# Patient Record
Sex: MALE | Race: BLACK OR AFRICAN AMERICAN | Employment: UNEMPLOYED | URBAN - METROPOLITAN AREA
[De-identification: names, ages, dates, MRNs, and addresses within clinical notes are randomized per-mention and may not be internally consistent; named-entity substitution may affect disease eponyms.]

---

## 2021-04-25 PROCEDURE — 99284 EMERGENCY DEPT VISIT MOD MDM: CPT

## 2021-04-26 ENCOUNTER — HOSPITAL ENCOUNTER (EMERGENCY)
Age: 56
Discharge: HOME | End: 2021-04-26
Attending: EMERGENCY MEDICINE
Payer: COMMERCIAL

## 2021-04-26 VITALS
SYSTOLIC BLOOD PRESSURE: 131 MMHG | DIASTOLIC BLOOD PRESSURE: 70 MMHG | OXYGEN SATURATION: 97 % | BODY MASS INDEX: 25.9 KG/M2 | WEIGHT: 165 LBS | TEMPERATURE: 97.9 F | HEART RATE: 73 BPM | RESPIRATION RATE: 16 BRPM | HEIGHT: 67 IN

## 2021-04-26 DIAGNOSIS — Z72.0 TOBACCO ABUSE: ICD-10-CM

## 2021-04-26 DIAGNOSIS — T50.901A ACCIDENTAL DRUG OVERDOSE, INITIAL ENCOUNTER: Primary | ICD-10-CM

## 2021-04-26 DIAGNOSIS — R03.0 ELEVATED BLOOD PRESSURE READING: ICD-10-CM

## 2021-04-26 DIAGNOSIS — T40.1X1A ACCIDENTAL OVERDOSE OF HEROIN, INITIAL ENCOUNTER (HCC): ICD-10-CM

## 2021-04-26 RX ORDER — NALOXONE HYDROCHLORIDE 4 MG/.1ML
SPRAY NASAL
Qty: 2 EACH | Refills: 0 | Status: SHIPPED | OUTPATIENT
Start: 2021-04-26

## 2021-04-26 RX ORDER — NALOXONE HYDROCHLORIDE 4 MG/.1ML
SPRAY NASAL
Qty: 2 EACH | Refills: 0 | OUTPATIENT
Start: 2021-04-26 | End: 2021-04-26 | Stop reason: SDUPTHER

## 2021-04-26 NOTE — DISCHARGE INSTRUCTIONS
Ting Sosa scheduled using triage protocol. Patients will be evaluated and referred to appropriate treatment. A  is usually assigned, but there is usually a waiting list. All [facility] residents without financial resources may be referred to [facility]. Patients must bring proof that they are residents of the [address] (Intrinsic-ID, rent receipt, picture ID, etc.).   [phone]  Crisis: [facility] and [facility]  700 [facility]     [phone]  Detox unit: [phone]  440 [facility]       Intakes are Monday, Wednesday, Thursday from 8 AM - 12 noon. Patients may walk in any day and speak with a counselor. Patient must bring a picture ID.   [phone]   [facility]       No detox available. Patient must be medically stable and able to work. Patient needs social security card and an ID. Patient does not need to be homeless. [address] [place] [address]       Detoxification available. Patients must be medically-cleared to go to detox and must be free of benzodiazepines and barbituates. THP prefers if they also have Clonidine available to help them with their detox. [address] [employer] 77 program available for men. Patients need to be able to work and follow rules. 90 days inpatient followed by 90 days in a [facility] house.    Valentine Sheikh  that hosts a number of [address] and NA groups each week   [phone]   [employer]  700 [facility] Patients must first go through registration and financial eligibility screening, 8 - 11:30 AM and 1 - 4 PM Mondays through Friday. [facility] also provides homeless services, vision, dental and medical services. [address] outpatient and some inpatient (sober living houses) for men and women. Fees are determined at time of admission. Patient must be able to work and follow rules. [address] for 67 [facility]       Outpatient program for men, women and adolescents. Assessments can usually be scheduled within 24 hours. Intensive outpatient programs also available. Methadone and Suboxone detoxification also available. They do not accept Medicaid or Medicare. [address] [facility] Service Google End: [address] End: [address]   Centralized Intake: [phone]  Crisis: [place] Morales. [phone]  Crisis: [facility]  [address]   [phone]  Crisis: [phone]   [address] Providers    Accepts Insured Patients Only:  [facility]  [address]       [phone]   Near the corner of [facility] and Door Van [facility] 430 in the near [place] end of [facility]. Accepts most insurance including Medicaid/Medicare. No psychiatry. On the [facility] bus line. 428 [facility] Center[facility] Hansen [phone]  [facility] ([address]  2000 [facility]. 30 [facility], [address] [place])     [phone]   Accepts most major insurances. Psychiatry available. Some DBT groups. [facility] [place])    [phone]   Mixture of psychologists and psychiatrists. They do not accept Medicaid or Medicare.     The [address]       [phone]   Mixture of clinical social workers and psychologists. Sliding Scale/Financial Aid/Differing Payment Options  [facility]  975 [facility]) [phone]   Our own Fantasma Gomez and Balta Elaine. Variety of treatment options, including DBT. [facility]  [address]       [phone]   Provides a variety of group and individual counseling options. Insurance, Medicaid, Medicare and sliding scale      Medicaid/Medicare providers in the [address] area  [address] [place] [address] Zehra. 22nd [address]       [phone]    [facility]         [address]       [phone]    [employer]  [address], [address]    [phone] ex.  751 [facility]     [phone]    [address] [place] [address]      [phone]      Services for patients without Medicaid, [place] or Insurance  The [address] [place] [address] Drive       [phone]   See handout in separate folder    Ruby Lozada

## 2021-04-26 NOTE — LETTER
[address] 
[facility] [place] [facility] EMERGENCY DEPT 
[address] [place] [phone] 
[phone] Work/School Note Date: 4/25/2021 To Whom It May concern: 
 
Mariann Prakash was seen and treated today in the emergency room by the following provider(s): 
Attending Provider: Dangelo Garcia MD. Mariann Prakash may return to work on 4/28/21. Sincerely, Manolo Sheppard MD

## 2021-12-15 ENCOUNTER — HOSPITAL ENCOUNTER (EMERGENCY)
Age: 56
Discharge: HOME | End: 2021-12-15
Attending: EMERGENCY MEDICINE
Payer: COMMERCIAL

## 2021-12-15 VITALS
DIASTOLIC BLOOD PRESSURE: 87 MMHG | WEIGHT: 160 LBS | BODY MASS INDEX: 23.7 KG/M2 | OXYGEN SATURATION: 96 % | TEMPERATURE: 97.7 F | HEART RATE: 87 BPM | SYSTOLIC BLOOD PRESSURE: 102 MMHG | RESPIRATION RATE: 18 BRPM | HEIGHT: 69 IN

## 2021-12-15 DIAGNOSIS — T40.1X1A ACCIDENTAL OVERDOSE OF HEROIN, INITIAL ENCOUNTER (HCC): Primary | ICD-10-CM

## 2021-12-15 PROCEDURE — 99284 EMERGENCY DEPT VISIT MOD MDM: CPT

## 2021-12-15 RX ORDER — NALOXONE HYDROCHLORIDE 1 MG/ML
2 INJECTION INTRAMUSCULAR; INTRAVENOUS; SUBCUTANEOUS AS NEEDED
Status: DISCONTINUED | OUTPATIENT
Start: 2021-12-15 | End: 2021-12-15 | Stop reason: HOSPADM

## 2021-12-15 NOTE — DISCHARGE INSTRUCTIONS
[address] [facility] [address]:  [facility] NEUROPSYCHIATRIC [facility] 85  [address]      [facility] 37       [phone]      Accepts Insured Patients Only:  [facility]  [address] [facility] [address]       [phone]  Near the corner of [facility] and Door Van [facility] 430 in the near Critical access hospital. Accepts most insurance including Medicaid/Medicare. No psychiatry. On the [facility] bus line. [address] most major insurances. Psychiatry available. Some DBT groups. [address] [facility] [address] 135 [phone]  Blanconolvia Sam, [facility] ([place])  Jerald Flight, [facility] ([place])  Agusto López [facility] (Adolescents SA)    [address] [facility] [address]  [facility] ([place])    [address] [facility], [address] [place])     [phone]   [facility] (Trauma)  Maximiliano Miller ([address])    [facility] Counseling 100[address] [facility] Ne)    [phone]   Mixture of psychologists and psychiatrists. They do not accept Medicaid or Medicare. The [facility] Group  [address] [place] [address]       [phone]   Mixture of clinical social workers and psychologists.     1011 [facility]        [phone]  3003 [facility], [address] [place] [address] Counseling and Treatment  (Clinicians: Meenakshi Shah LCSW and POOJA Hatch both specialize in Trauma)  [address], [address]        [phone]     [place] [address] [facility] [address]  [address], [address]         [address], 535 [facility], [address]  [address], [address]        [address], 535 [facility], 2231 [facility]  [address], [address]        [address] Jihan Cazares Se Counseling  [address]  [address], [address]        91 [facility] Counseling Associates Yani psychologists practice here)  KELLY Beth [address] [place], [address]  [place], 200 [facility]        [phone]    Sliding Scale/Financial Aid/Differing Payment Options  [employer] 25[address] [facility]) [phone]   Variety of treatment options, including DBT. [facility]  3[address] [facility]       [phone]   Provides a variety of group and individual counseling options. Insurance, Medicaid, Medicare and sliding scale    [place], [address]      [phone]    350 [facility] Psychological Services and Development ([facility])  [address] Anmol jenn [place], [address] [place]    [phone]  Training clinic for [employer] in Psychology; [facility] also carry some cases as well. Director: Ana Miller, Ph.D., LCP, NCSP (* She specializes in Anxiety; Child & Adol. Mental health)      Medicaid/Medicare providers in the [phone]    [facility]         [address]       [phone]    [place]  [address], [address]    [phone] ex. [place], [address]      [phone]    [address] [place] [address]     [phone]  [facility], [address] [address], [address]    1850 [facility], [address] [place] [address] Integrative Counseling Main Office    [phone] [facility] First Stephane [address] [place]    Intensive Community Outreach Services (ICOS)  Call ahead for appointment time  200 [facility]     [phone]    [address]      [facility] [address]    1 [facility]      [address] 19 [facility] BrooklynGopal Gifty [place]   [phone]      Services for patients without Medicaid, Medicare or Insurance  The [address]       [phone]   See handout in separate folder    1563 Patrice Lovelogica on-site, psychiatry, AA meetings, counseling and social workers  [place]: BarbaraGarcia Reinier 16 [phone]    [address] [place] [address] [place]:  Via [facility] [place] 129 [phone]      Medication Assisted Treatment (MAT) Programs  The purpose of Medication Assisted Treatment (MAT) programs is to provide quality treatment for individuals living with Substance Use Disorder. We understand that after patients undergo a detox, some may need MAT treatment services to provide additional assistance on their road to recovery. When it comes to medication assisted treatment, Suboxone, Buprenorphine, and other drugs can serve as a valuable resource during recovery. We know that making such a significant change is difficult for patients and want to do everything we can to make the addiction treatment process as smooth and comfortable as possible. There are several locations around [place]; many can offer same day or next day appointments. Please call      [facility] (1260 [facility])  [address] [place] [address], [address] [place], [place] [phone]  Standing ED [facility] ED referral appointment 10:00-11:00 Tuesday - Friday    [facility])   [address] [phone]  Standing [facility] ED referral appointment 10:00-11:00 Monday    [facility] location)   58 [facility]  [phone]  Call or email Ivan Pierce: [phone], [email or web address]  To schedule an appointment    The [facility]  [address] [facility] 6, [address]  for Recovery  Izzy Poole 70..   [place], [place] 310 [facility]  [address] [facility] [address] [facility] [address], [facility] [address] David [place], [address]  [address] [facility] [address] ([employer])  1516 [facility], [address]/Jaime Enamorado Final  [phone]. [phone]    Recovery from Addiction is possible!

## 2021-12-15 NOTE — ED PROVIDER NOTES
EMERGENCY DEPARTMENT HISTORY AND PHYSICAL EXAM     ------------------------------------------------------------------------------------------------------  Please note that this dictation was completed with [employer], the [employer] voice recognition software. Quite often unanticipated grammatical, syntax, homophones, and other interpretive errors are inadvertently transcribed by the computer software. Please disregard these errors. Please excuse any errors that have escaped final proofreading.  -----------------------------------------------------------------------------------------------------------------    Date: 12/15/2021  Patient Name: Brigida James    History of Presenting Illness     No chief complaint on file. History Provided By: Patient, EMS    HPI: Brigida James is a 64 y.o. male, with significant pmhx of substance abuse, who presents via EMS to the ED with c/o altered mental status. Patient's family came downstairs to find him sitting on the counter unresponsive. Patient with snoring respirations upon EMS arrival.  Was given 1 mg of IV Narcan with improvement of respiratory status and alert and oriented upon arrival to the emergency department. Patient reports having snorted heroin just before this occurred. Denies chest pain, shortness of breath, nausea, vomiting, visual disturbance. PCP: None    Social Hx: denies tobacco, denies EtOH, denies recreational/ Illicit Drugs     There are no other complaints, changes, or physical findings at this time. No Known Allergies      Current Facility-Administered Medications   Medication Dose Route Frequency Provider Last Rate Last Admin    naloxone (NARCAN) injection 2 mg  2 mg IntraVENous PRN Shirin Bowser MD         Current Outpatient Medications   Medication Sig Dispense Refill    naloxone (Narcan) 4 mg/actuation nasal spray Use 1 spray intranasally, then discard.  Repeat with new spray every 2 min as needed for opioid overdose symptoms, alternating nostrils. 2 Each 0       Past History     Past Medical History:  No past medical history on file. Past Surgical History:  No past surgical history on file. Family History:  No family history on file. Social History:  Social History     Tobacco Use    Smoking status: Not on file    Smokeless tobacco: Not on file   Substance Use Topics    Alcohol use: Not on file    Drug use: Not on file       Allergies:  No Known Allergies      Review of Systems   Review of Systems   Constitutional: Negative for chills and fever. HENT: Negative. Eyes: Negative. Respiratory: Negative for cough, chest tightness and shortness of breath. Cardiovascular: Negative for chest pain and leg swelling. Gastrointestinal: Negative for abdominal pain, diarrhea, nausea and vomiting. Endocrine: Negative. Genitourinary: Negative for difficulty urinating and dysuria. Musculoskeletal: Negative for myalgias. Skin: Negative. Neurological: Negative. Psychiatric/Behavioral: Negative. All other systems reviewed and are negative. Physical Exam   Physical Exam  Vitals and nursing note reviewed. Constitutional:       General: He is not in acute distress. Appearance: He is well-developed. He is not diaphoretic. HENT:      Head: Normocephalic and atraumatic. Nose: Nose normal.      Mouth/Throat:      Pharynx: No oropharyngeal exudate. Eyes:      Conjunctiva/sclera: Conjunctivae normal.      Pupils: Pupils are equal, round, and reactive to light. Neck:      Vascular: No JVD. Cardiovascular:      Rate and Rhythm: Normal rate and regular rhythm. Heart sounds: Normal heart sounds. No murmur heard. No friction rub. Pulmonary:      Effort: Pulmonary effort is normal. No respiratory distress. Breath sounds: Normal breath sounds. No stridor. No wheezing or rales. Abdominal:      General: Bowel sounds are normal. There is no distension. Palpations: Abdomen is soft. Tenderness: There is no abdominal tenderness. There is no rebound. Musculoskeletal:         General: No tenderness. Normal range of motion. Cervical back: Normal range of motion and neck supple. Skin:     General: Skin is warm and dry. Findings: No rash. Neurological:      Mental Status: He is alert and oriented to person, place, and time. Cranial Nerves: No cranial nerve deficit. Psychiatric:         Speech: Speech normal.         Behavior: Behavior normal.         Thought Content: Thought content normal.         Judgment: Judgment normal.           Diagnostic Study Results     Labs -   No results found for this or any previous visit (from the past 12 hour(s)). Radiologic Studies -   No orders to display     CT Results  (Last 48 hours)    None        CXR Results  (Last 48 hours)    None            Medical Decision Making   I am the first provider for this patient. I reviewed the vital signs, available nursing notes, past medical history, past surgical history, family history and social history. Vital Signs-Reviewed the patient's vital signs. Patient Vitals for the past 12 hrs:   Temp Pulse Resp BP SpO2   12/15/21 0345 -- -- -- (!) 111/59 95 %   12/15/21 0330 -- -- -- 115/63 95 %   12/15/21 0315 -- -- -- 114/65 95 %   12/15/21 0309 -- -- -- -- 97 %   12/15/21 0134 97.7 °F (36.5 °C) 87 18 121/70 96 %       Pulse Oximetry Analysis - 97% on RA Normal    Records Reviewed/Interpretted: Nursing Notes from triage and Old Medical Records, any previous accidental drug overdose 6 months ago    Provider Notes (Medical Decision Making):     DDX:  Heroin overdose    Plan:  Monitor, Narcan as needed    Impression:  Accidental heroin overdose    ED Course:   Initial assessment performed. The patients presenting problems have been discussed, and they are in agreement with the care plan formulated and outlined with them.   I have encouraged them to ask questions as they arise throughout their visit.    I reviewed our electronic medical record system for any past medical records that were available that may contribute to the patients current condition, the nursing notes and and vital signs from today's visit  Nursing notes will be reviewed as they become available in realtime while the pt has been in the ED. Jayden English MD        3:59 AM  Progress note:  Pt noted to be feeling better, remains awake alert ready for discharge. Pt will follow up with primary care mental health resources provided as instructed. All questions have been answered, pt voiced understanding and agreement with plan. Specific return precautions provided in addition to instructions for pt to return to the ED immediately should sx worsen at any time. Jayden English MD             Critical Care Time:     none      Diagnosis     Clinical Impression:   1. Accidental overdose of heroin, initial encounter ([facility] [address]        PLAN:  1. Current Discharge Medication List        2. Follow-up Information     Follow up With Specialties Details Why Contact Info    [facility] EMERGENCY DEPT Emergency Medicine  As needed [facility]  [address] [facility]  [phone]        Return to ED if worse     Disposition:    3:59 AM   The patient's results have been reviewed with family and/or caregiver. They verbally convey their understanding and agreement of the patient's signs, symptoms, diagnosis, treatment and prognosis and additionally agree to follow up as recommended in the discharge instructions or to return to the Emergency Room should the patient's condition change prior to their follow-up appointment. The family and/or caregiver verbally agrees with the care-plan and all of their questions have been answered.  The discharge instructions have also been provided to the them with educational information regarding the patient's diagnosis as well a list of reasons why the patient would want to return to the ER prior to their follow-up appointment should their condition change.   Leigh De Los Santos MD

## 2021-12-15 NOTE — ED NOTES
I have reviewed discharge instructions with the patient. The patient verbalized understanding. Pt given discharge instructions at this time. PT encouraged to return to the ED if he develops any new/worsening symptoms. Pt denies any additional needs or concerns at this time.

## 2021-12-15 NOTE — ED NOTES
PT ambulatory to room from triage. Pt here today with complaints of OD. Pt states that he snorted heroin tonight and walked home. PT states that once he got home he was talking to his family and passed out. PT states that he has done heroin before with no problems. Pt states that he has had a 10/10 frontal headache for the psat two weeks that he has been taking extra strength tylenol for. PT denies cp, sob, abd pain, n/v/d, fever, hitting head. Pt ANOX4, respirations even and unlabored, skin warm dry and intact, NAD noted. PT was given narcan in field PTA.

## 2022-08-19 ENCOUNTER — HOSPITAL ENCOUNTER (EMERGENCY)
Age: 57
Discharge: HOME | End: 2022-08-19
Attending: EMERGENCY MEDICINE
Payer: COMMERCIAL

## 2022-08-19 VITALS
WEIGHT: 164.8 LBS | BODY MASS INDEX: 24.41 KG/M2 | HEIGHT: 69 IN | TEMPERATURE: 98.9 F | OXYGEN SATURATION: 98 % | SYSTOLIC BLOOD PRESSURE: 107 MMHG | RESPIRATION RATE: 15 BRPM | HEART RATE: 91 BPM | DIASTOLIC BLOOD PRESSURE: 70 MMHG

## 2022-08-19 DIAGNOSIS — T40.1X1A ACCIDENTAL OVERDOSE OF HEROIN, INITIAL ENCOUNTER (HCC): Primary | ICD-10-CM

## 2022-08-19 LAB
COMMENT, HOLDF: NORMAL
SAMPLES BEING HELD,HOLD: NORMAL

## 2022-08-19 PROCEDURE — 99284 EMERGENCY DEPT VISIT MOD MDM: CPT

## 2022-08-19 PROCEDURE — 74011250636 HC RX REV CODE- 250/636

## 2022-08-19 PROCEDURE — 96374 THER/PROPH/DIAG INJ IV PUSH: CPT

## 2022-08-19 PROCEDURE — 36415 COLL VENOUS BLD VENIPUNCTURE: CPT

## 2022-08-19 RX ORDER — NALOXONE HYDROCHLORIDE 1 MG/ML
INJECTION INTRAMUSCULAR; INTRAVENOUS; SUBCUTANEOUS
Status: COMPLETED
Start: 2022-08-19 | End: 2022-08-19

## 2022-08-19 RX ORDER — NALOXONE HYDROCHLORIDE 1 MG/ML
2 INJECTION INTRAMUSCULAR; INTRAVENOUS; SUBCUTANEOUS
Status: COMPLETED | OUTPATIENT
Start: 2022-08-19 | End: 2022-08-19

## 2022-08-19 RX ADMIN — NALOXONE HYDROCHLORIDE 2 MG: 1 INJECTION PARENTERAL at 16:56

## 2022-08-19 RX ADMIN — NALOXONE HYDROCHLORIDE 2 MG: 1 INJECTION INTRAMUSCULAR; INTRAVENOUS; SUBCUTANEOUS at 16:56

## 2022-08-19 NOTE — ED NOTES
Emergency Department Nursing Plan of Care       The Nursing Plan of Care is developed from the Nursing assessment and Emergency Department Attending provider initial evaluation. The plan of care may be reviewed in the ED Provider note.     The Plan of Care was developed with the following considerations:   Patient / Family readiness to learn indicated by:verbalized understanding  Persons(s) to be included in education: patient  Barriers to Learning/Limitations:No    Signed     Laura Keita RN    8/19/2022   5:36 PM

## 2022-08-19 NOTE — ED NOTES
Discharge instructions were given to the patient by Lea Martin RN. The patient left the Emergency Department ambulatory, alert and oriented and in no acute distress with 0 prescriptions. The patient was encouraged to call or return to the ED for worsening issues or problems and was encouraged to schedule a follow up appointment for continuing care. The patient verbalized understanding of discharge instructions and prescriptions, all questions were answered. The patient has no further concerns at this time.

## 2022-08-19 NOTE — ED TRIAGE NOTES
Pt BIBA complaining of drug overdose. EMS reports pt's daughter called ems d/t pt being less responsive. EMS states pt was found standing up but would not respond to verbal commands. Pt's O2 was 85% on RA. EMS denies giving narcan. Pt responsive to noxious stimuli but will not answer questions. IV established. Pt given 2mg of Narcan IV. Pt more awake and alert. Pt endorses \"sniffing heroin\". Pt is tachycardic. Pt denies cocaine use.

## 2022-08-19 NOTE — ED PROVIDER NOTES
EMERGENCY DEPARTMENT HISTORY AND PHYSICAL EXAM      Date: 8/19/2022  Patient Name: Shaina Gutierrez    History of Presenting Illness     Chief Complaint   Patient presents with    Drug Overdose       History Provided By: EMS    HPI: Shaina Gutierrez, 64 y.o. male presents to the ED with cc of being found on the ground unresponsive by EMTs prior to arrival.  No signs of trauma. On ED arrival patient had pinpoint pupils bilaterally. Patient was able to maintain his own airway. Patient was given 2 mg of Narcan IV in the ED and woke up. There are no other associated symptoms, patient concerns, or physical findings at this time. I reviewed the vital signs, available nursing notes, past medical history, past surgical history, family history and social history. Vital Signs:  Patient Vitals for the past 12 hrs:   Temp Pulse Resp BP SpO2   08/19/22 1658 98.9 °F (37.2 °C) -- -- -- --   08/19/22 1658 -- (!) 158 22 -- 99 %   08/19/22 1652 98.8 °F (37.1 °C) (!) 145 11 114/78 (!) 85 %     Vital signs reviewed. Current Medications:  No current facility-administered medications on file prior to encounter. Current Outpatient Medications on File Prior to Encounter   Medication Sig Dispense Refill    naloxone (Narcan) 4 mg/actuation nasal spray Use 1 spray intranasally, then discard. Repeat with new spray every 2 min as needed for opioid overdose symptoms, alternating nostrils. 2 Each 0       Past History     Past Medical History:  No past medical history on file. Past Surgical History:  No past surgical history on file. Family History:  No family history on file. Social History: Allergies:  No Known Allergies      Review of Systems   Review of Systems   Constitutional:  Negative for fever. HENT:  Negative for sore throat and trouble swallowing. Eyes:  Negative for photophobia and redness. Respiratory:  Negative for cough and shortness of breath.     Cardiovascular:  Negative for chest pain and leg swelling. Gastrointestinal:  Negative for abdominal pain, constipation, diarrhea, nausea and vomiting. Endocrine: Negative for polydipsia and polyuria. Genitourinary:  Negative for dysuria, hematuria and scrotal swelling. Musculoskeletal:  Negative for back pain and joint swelling. Skin:  Negative for rash. Neurological:  Negative for dizziness, syncope, weakness and headaches. Psychiatric/Behavioral:  Positive for behavioral problems, confusion and decreased concentration. Negative for suicidal ideas. All other systems reviewed and are negative. Physical Exam   Physical Exam  Vitals and nursing note reviewed. Exam conducted with a chaperone present. Constitutional:       General: He is not in acute distress. Appearance: Normal appearance. He is well-developed. HENT:      Head: Normocephalic and atraumatic. Nose: Nose normal.      Mouth/Throat:      Pharynx: No oropharyngeal exudate. Eyes:      General:         Right eye: No discharge. Left eye: No discharge. Extraocular Movements: Extraocular movements intact. Conjunctiva/sclera: Conjunctivae normal.      Comments: Pinpoint pupils bilaterally. Neck:      Vascular: No JVD. Cardiovascular:      Rate and Rhythm: Normal rate and regular rhythm. Heart sounds: Normal heart sounds. Pulmonary:      Effort: Pulmonary effort is normal. No respiratory distress. Breath sounds: Normal breath sounds. No wheezing. Abdominal:      General: Bowel sounds are normal. There is no distension. Palpations: Abdomen is soft. Tenderness: There is no abdominal tenderness. There is no guarding or rebound. Musculoskeletal:         General: No tenderness. Normal range of motion. Cervical back: Normal range of motion and neck supple. Lymphadenopathy:      Cervical: No cervical adenopathy. Skin:     General: Skin is warm and dry. Capillary Refill: Capillary refill takes less than 2 seconds. Findings: No rash. Neurological:      Mental Status: He is alert. He is disoriented. Cranial Nerves: No cranial nerve deficit. Deep Tendon Reflexes: Reflexes are normal and symmetric. Comments: GCS of 11. Psychiatric:         Behavior: Behavior normal.       Emergency Department Course   ED Course:  Initial assessment performed. The patient's complaints have been discussed, and they are in agreement with the care plan formulated and outlined with them. I have encouraged them to ask questions as they arise throughout their visit. EKG interpretation: (Preliminary)    Medical Decision Making:  Acute heroin overdose, altered mental status, hypoglycemia. Critical Care Time:  CRITICAL CARE NOTE :    5:40 PM      IMPENDING DETERIORATION -Airway, Respiratory, Cardiovascular, CNS, and Metabolic    ASSOCIATED RISK FACTORS - Shock, Hypoxia, Dysrhythmia, Metabolic changes, and CNS Decompensation    MANAGEMENT- Bedside Assessment and Supervision of Care    INTERPRETATION -  Blood Pressure and Cardiac Output Measures     INTERVENTIONS - hemodynamic mngmt and Metobolic interventions    CASE REVIEW - Nursing and Family    TREATMENT RESPONSE -Stable    PERFORMED BY - Self        NOTES   :      I have spent 60 minutes of critical care time involved in lab review, consultations with specialist, family decision- making, bedside attention and documentation. During this entire length of time I was immediately available to the patient . This time excludes time spent in any separate bill procedures. Conard Spatz, MD                                                Procedure:      Progress note:   Time:6:00 pm patient is alert and oriented x3, ambulating without difficulties. Disposition:  DISCHARGED at 6:10 pm,  I reviewed exam findings, diagnostic results, and clinical impression with patient. Counseled patient on diagnosis and care plan.  Encouraged patient to ask questions and discussed need for follow up with primary care and to return to ED precautions. Patient expresses understanding at this time. I have reviewed discharge instructions with the patient and/or family/caregiver who verbalized understanding. The patient has been re-evaluated and is ready for discharge. Discharge instructions have been provided and explained to the patient. Ready for discharge. DISCHARGE PLAN:  1. Current Discharge Medication List        2. Follow-up Information    None       3. Return to ED if current symptoms worsen or new symptoms arise. 4. Follow up with None in 3-5 days. Diagnosis     Clinical Impression: No diagnosis found.

## 2022-09-11 ENCOUNTER — HOSPITAL ENCOUNTER (EMERGENCY)
Age: 57
Discharge: LEFT BEFORE DISCHARGE | End: 2022-09-11
Attending: EMERGENCY MEDICINE
Payer: COMMERCIAL

## 2022-09-11 VITALS
HEIGHT: 67 IN | WEIGHT: 154.1 LBS | RESPIRATION RATE: 18 BRPM | OXYGEN SATURATION: 95 % | HEART RATE: 98 BPM | DIASTOLIC BLOOD PRESSURE: 88 MMHG | BODY MASS INDEX: 24.19 KG/M2 | TEMPERATURE: 98.4 F | SYSTOLIC BLOOD PRESSURE: 138 MMHG

## 2022-09-11 DIAGNOSIS — T40.2X1A UNINTENTIONAL POISONING BY OPIOID, INITIAL ENCOUNTER (HCC): Primary | ICD-10-CM

## 2022-09-11 PROCEDURE — 99283 EMERGENCY DEPT VISIT LOW MDM: CPT

## 2022-09-11 NOTE — DISCHARGE INSTRUCTIONS
Local Primary Care Physicians  [facility] [phone]  Jackelin Rao, MD Diana oBone MD [facility] Doctors [phone]  Ameena Davidson, [facility]  David Bailey, MD Lisa Lawson, MD Herson Fierro Nicci Mitchell  [phone]  [place]aya [place] [address] [facility] [phone]  MD Katheryn Jimenez, MD Val Chowdhury MD   [facility] [phone]  [facility] IMANI WHITNEY, MD Claudio Lazcano, MD Faustino Tay, NP [address] [phone]  Dean Goldstein, MD Virgil Cordon, MD Lisbeth Willett, MD Shaq Berry, MD Castro Lewis, MD Princess Knott MD   [address] [facility] MD [facility] [phone]  MD Anuja Patel, NP  Mariajose Mo, MD Isis Quezada, MD Elfego Staton, MD Tom Palacios, MD Marshal Gutierrez MD   [address] [facility] [phone]  MD Lucrecia Raygoza, P  [facility], NP  Adrián Zambrano, MD Yoly Bonner, MD Brad Flor, MD Sandrine Lomeli MD [facility] [phone]  MD Tito Dowd MD Felicitas Fuse, MD Minnette Aus, MD Edrick Catching, MD   [facility] [phone]  MD Caroline Mejia MD [place] [phone]  MD Gallo Lizarraga MD Delane Goldman, MD   [facility] [phone]  Ke Wakefield, MD Polo Bob, MD Britany Beltran, MD Ramesh Montanez, NP  Gallito Strong MD [address]   [phone]  MD Izzy Diaz, MD Shira Jones MD   [address] [facility] [phone]  Jerardo Guevara, MD Beth Mederos, FNP Efraim Fleischer, PA-C Efraim Fleischer, MAGDA Gentile, MD Major Figueredo NP Doyne Salaam, DO             Miscellaneous:  Delphine Maurer MD [facility] Departments     For adult and child immunizations, family planning, TB screening, STD testing and women's health services. [facility] [place] [phone]      [address]   [address]   [address]   [address] [facility] Baldwin [address] [facility] [address] [phone]      [address] [facility]          [address] [place] [address]     For primary care services, woman and child wellness, and some clinics providing specialty care. [facility] -- [address] [facility] [address] [facility] [phone]   411 [facility] 200 [facility] 3614 [facility] [phone]   339 [facility] End [facility] [address] [phone]   [address]  [place] [place] [address] 5850  Community  [phone]   [address] [place], [place] [address] [place] [address] [phone]   [facility] [address] [facility] [phone]   [facility] 1051 [facility] [phone]   [facility]: [facility] bryant Manuel [address] [place] [address], [address] [phone]     [address] [phone]   [facility] Out[facility] 5850 [facility]  [phone]   [employer]  [address], [address] ([email or web address]) [phone]         Sexual Health/Woman Wellness Clinics    For STD/HIV testing and treatment, pregnancy testing and services, men's health, birth control services, LGBT services, and hepatitis/HPV vaccine services. [employer] [employer] [facility] [place] [employer] [address] [facility] 75 [facility] 157 600 GUILLAUME Guillen [phone]   [address], 5th floor [phone]   Pregnancy [facility] of 59 [facility] [address]  [place] [phone]         [place] [address] [facility] [address] [place] [address]   [phone]   [place]   [phone]   Women, Infant and Children's Services: [place]  [phone]       [address] [place] [address]   [phone]   [address]   [address] [facility] Psychiatry     [phone]   [address] Crisis   1212 [facility] [phone]

## 2022-09-11 NOTE — ED NOTES
Patient attempting to exit through EMS doors with IV intact, patient asked to return to room. Reports he must check on his ride that is waiting outside as he did not want to get stuck walking home. Patient's IV removed to ensure that patient did not elope with IV intact. Patient asked again to return to room but seen exiting ED though waiting area.

## 2022-09-11 NOTE — ED PROVIDER NOTES
EMERGENCY DEPARTMENT HISTORY AND PHYSICAL EXAM      Date: 9/11/2022  Patient Name: Kevin Serra    History of Presenting Illness     Chief Complaint   Patient presents with    Drug Overdose     Pt arrives via EMS from apt complex d/t OD. Pt was found laying in the grass outside his apt complex. Upon arrival to scene EMS inserted an NPA and bagged pt. Pt received 2 mg intranasal narcan and became alert. Pt a/ox 3 upon arrival w/ normal respirations.  - no hx of DM        History Provided By: Patient    HPI: Kevin Serra, 64 y.o. male presents to the ED with cc of overdose. 51-year-old male presents after an unintentional overdose. Patient reports he \"slipped up\" and did some dope. Patient reports that he was given Narcan on scene. He was otherwise in his normal state of health. Patient reports he has no complaints currently. He denies any suicidal ideation. There are no other complaints, changes, or physical findings at this time. PCP: None    No current facility-administered medications on file prior to encounter. Current Outpatient Medications on File Prior to Encounter   Medication Sig Dispense Refill    naloxone (Narcan) 4 mg/actuation nasal spray Use 1 spray intranasally, then discard. Repeat with new spray every 2 min as needed for opioid overdose symptoms, alternating nostrils. 2 Each 0       Past History     Past Medical History:  History reviewed. No pertinent past medical history. Past Surgical History:  History reviewed. No pertinent surgical history. Family History:  History reviewed. No pertinent family history.     Social History:  Social History     Tobacco Use    Smoking status: Every Day     Packs/day: 0.50     Types: Cigarettes    Smokeless tobacco: Never   Substance Use Topics    Alcohol use: Not Currently    Drug use: Yes     Types: Heroin     Comment: hx OD       Allergies:  No Known Allergies      Review of Systems   Review of Systems   Constitutional:  Negative for fever. HENT:  Negative for voice change. Eyes:  Negative for pain and redness. Respiratory:  Negative for cough and chest tightness. Cardiovascular:  Negative for chest pain and leg swelling. Gastrointestinal:  Negative for abdominal pain, diarrhea, nausea and vomiting. Genitourinary:  Negative for hematuria. Musculoskeletal:  Negative for gait problem. Skin:  Negative for color change, pallor and rash. Neurological:  Negative for facial asymmetry, weakness and headaches. Hematological:  Does not bruise/bleed easily. Psychiatric/Behavioral:  Negative for behavioral problems. All other systems reviewed and are negative. Physical Exam   Physical Exam  Vitals and nursing note reviewed. Constitutional:       Comments: 80-year-old male, resting bed, no acute distress   HENT:      Head: Normocephalic and atraumatic. Nose: Nose normal.      Mouth/Throat:      Mouth: Mucous membranes are moist.   Eyes:      Pupils: Pupils are equal, round, and reactive to light. Cardiovascular:      Rate and Rhythm: Normal rate and regular rhythm. Pulses: Normal pulses. Heart sounds: No murmur heard. No friction rub. No gallop. Pulmonary:      Effort: Pulmonary effort is normal.      Breath sounds: Normal breath sounds. No wheezing, rhonchi or rales. Abdominal:      General: Abdomen is flat. There is no distension. Palpations: Abdomen is soft. Tenderness: There is no abdominal tenderness. Musculoskeletal:         General: Normal range of motion. Cervical back: Normal range of motion. Right lower leg: No edema. Left lower leg: No edema. Skin:     General: Skin is warm and dry. Capillary Refill: Capillary refill takes less than 2 seconds. Neurological:      General: No focal deficit present. Mental Status: He is alert.    Psychiatric:         Mood and Affect: Mood normal.       Diagnostic Study Results     Labs -   No results found for this or any previous visit (from the past 12 hour(s)). Radiologic Studies -   No orders to display     CT Results  (Last 48 hours)      None          CXR Results  (Last 48 hours)      None            Medical Decision Making   I am the first provider for this patient. I reviewed the vital signs, available nursing notes, past medical history, past surgical history, family history and social history. Vital Signs-Reviewed the patient's vital signs. Patient Vitals for the past 12 hrs:   Temp Pulse Resp BP SpO2   09/11/22 1445 98.4 °F (36.9 °C) 98 18 138/88 95 %     Records Reviewed: Nursing Notes and Old Medical Records    Provider Notes (Medical Decision Making):     49-year-old male presents after Narcan administered status post unintentional overdose. His vitals are unremarkable. Will observe in ED, patient remains awake without additional Narcan given, he can be discharged with PCP resources. ED Course:   Initial assessment performed. The patients presenting problems have been discussed, and they are in agreement with the care plan formulated and outlined with them. I have encouraged them to ask questions as they arise throughout their visit. ED Course as of 09/11/22 2315   Sun Sep 11, 2022   1529 Notified by nursing the patient is requesting discharge. No additional doses of naloxone given. [MB]      ED Course User Index  [MB] Kim Stiles MD     I was updated that patient eloped from the emergency department. He left without receiving paperwork. Lissette Taveras MD      Disposition:    Eloped    DISCHARGE PLAN:  1. Discharge Medication List as of 9/11/2022  3:44 PM        2. Follow-up Information       Follow up With Specialties Details Why Contact Info    [facility] EMERGENCY DEPT Emergency Medicine In 3 days  [address] [place] [address] [facility]  [phone]          3. Return to ED if worse     Diagnosis     Clinical Impression:   1.  Unintentional poisoning by opioid, initial encounter [facility])        Attestations:    Luiz Conn MD        Please note that this dictation was completed with Ne[employer], the computer voice recognition software. Quite often unanticipated grammatical, syntax, homophones, and other interpretive errors are inadvertently transcribed by the computer software. Please disregard these errors. Please excuse any errors that have escaped final proofreading. Thank you.

## 2022-09-11 NOTE — ED NOTES
Pt arrives via EMS from apt complex d/t OD. Pt was found laying in the grass outside his apt complex. Upon arrival to scene EMS inserted an NPA and bagged pt. Pt received 2 mg intranasal narcan and became alert. Pt a/ox 4 upon arrival w/ normal respirations.   - no hx of DM     1500 MD at bedside

## 2023-03-13 ENCOUNTER — APPOINTMENT (OUTPATIENT)
Dept: GENERAL RADIOLOGY | Age: 58
End: 2023-03-13
Attending: STUDENT IN AN ORGANIZED HEALTH CARE EDUCATION/TRAINING PROGRAM
Payer: COMMERCIAL

## 2023-03-13 ENCOUNTER — HOSPITAL ENCOUNTER (EMERGENCY)
Age: 58
Discharge: HOME | End: 2023-03-13
Attending: STUDENT IN AN ORGANIZED HEALTH CARE EDUCATION/TRAINING PROGRAM
Payer: COMMERCIAL

## 2023-03-13 VITALS
OXYGEN SATURATION: 96 % | HEART RATE: 71 BPM | RESPIRATION RATE: 16 BRPM | HEIGHT: 65 IN | WEIGHT: 150 LBS | TEMPERATURE: 98.3 F | BODY MASS INDEX: 24.99 KG/M2 | DIASTOLIC BLOOD PRESSURE: 72 MMHG | SYSTOLIC BLOOD PRESSURE: 138 MMHG

## 2023-03-13 DIAGNOSIS — T40.2X1A OPIOID OVERDOSE, ACCIDENTAL OR UNINTENTIONAL, INITIAL ENCOUNTER (HCC): Primary | ICD-10-CM

## 2023-03-13 PROCEDURE — 99284 EMERGENCY DEPT VISIT MOD MDM: CPT

## 2023-03-13 PROCEDURE — 74011250636 HC RX REV CODE- 250/636: Performed by: STUDENT IN AN ORGANIZED HEALTH CARE EDUCATION/TRAINING PROGRAM

## 2023-03-13 PROCEDURE — 96374 THER/PROPH/DIAG INJ IV PUSH: CPT

## 2023-03-13 PROCEDURE — 93005 ELECTROCARDIOGRAM TRACING: CPT

## 2023-03-13 PROCEDURE — 71045 X-RAY EXAM CHEST 1 VIEW: CPT

## 2023-03-13 RX ORDER — NALOXONE HYDROCHLORIDE 4 MG/.1ML
SPRAY NASAL
Qty: 2 EACH | Refills: 0 | Status: SHIPPED | OUTPATIENT
Start: 2023-03-13

## 2023-03-13 RX ORDER — NALOXONE HYDROCHLORIDE 0.4 MG/ML
0.4 INJECTION, SOLUTION INTRAMUSCULAR; INTRAVENOUS; SUBCUTANEOUS AS NEEDED
Status: DISCONTINUED | OUTPATIENT
Start: 2023-03-13 | End: 2023-03-14 | Stop reason: HOSPADM

## 2023-03-13 RX ADMIN — NALOXONE HYDROCHLORIDE 0.4 MG: 0.4 INJECTION, SOLUTION INTRAMUSCULAR; INTRAVENOUS; SUBCUTANEOUS at 16:58

## 2023-03-13 NOTE — ED PROVIDER NOTES
[facility] EMERGENCY DEPT  EMERGENCY DEPARTMENT ENCOUNTER       Pt Name: Trang Godoy  MRN: [id number]  [place] 1965  Date of evaluation: 3/13/2023  Provider: Renato Martines MD   PCP: None  Note Started: 5:27 PM 3/13/23     CHIEF COMPLAINT       Chief Complaint   Patient presents with    Drug Overdose     Pt reports sniffing heroine today. Pt found \"falling out by family\" initial call was cardiac arrest. Upon EMS arrival pt did have a pulse, agonal respirations and NPA placed with 0.5mL Narcan given with immediate response. Pt awake, alert and interactive at this time. Denies SI/HI         HISTORY OF PRESENT ILLNESS: 1 or more elements    History From: Patient  HPI Limitations : None     Trang Godoy is a 62 y.o. male with Pmhx listed below     Patient is a 80-year-old male with history of substance use disorder being brought in by EMS after an accidental overdose, patient reports sniffing heroin today, states that he was with family who called EMS due to concern of cardiac arrest, upon their arrival patient did have a pulse but had agonal breathing, NPA was placed and Narcan was given with immediate response, patient was then alert and able to respond to questions. Patient denies any pain today, states he has no chest pain or shortness of breath, patient denies any history of OD in the past but states that he does endorse heroin use. Patient states that this was not intentional, denies any other complaints at this time. Nursing Notes were all reviewed and agreed with or any disagreements were addressed in the HPI. REVIEW OF SYSTEMS      Positives and Pertinent negatives as per HPI. PAST HISTORY     Past Medical History:  History reviewed. No pertinent past medical history. Current Discharge Medication List          Past Surgical History:  History reviewed. No pertinent surgical history. Family History:  History reviewed. No pertinent family history.     Social History:  Social History Tobacco Use    Smoking status: Every Day     Packs/day: 0.50     Types: Cigarettes    Smokeless tobacco: Never   Substance Use Topics    Alcohol use: Not Currently    Drug use: Yes     Types: Heroin     Comment: hx OD       Allergies:  No Known Allergies    PHYSICAL EXAM      ED Triage Vitals [03/13/23 1616]   ED Encounter Vitals Group      BP (!) 146/86      Pulse (Heart Rate) (!) 102      Resp Rate 16      Temp       Temp src       O2 Sat (%) 95 %      Weight 150 lb      Height 5' 5\"        Physical Exam  Vitals reviewed. Constitutional:       General: He is not in acute distress. Appearance: Normal appearance. He is not ill-appearing, toxic-appearing or diaphoretic. HENT:      Head: Normocephalic. Mouth/Throat:      Mouth: Mucous membranes are moist.   Eyes:      Conjunctiva/sclera: Conjunctivae normal.   Cardiovascular:      Rate and Rhythm: Normal rate. Pulmonary:      Effort: Pulmonary effort is normal. No respiratory distress. Abdominal:      General: Abdomen is flat. Musculoskeletal:         General: No deformity. Cervical back: Neck supple. Skin:     General: Skin is warm and dry. Neurological:      General: No focal deficit present. Mental Status: He is alert.       Comments: Patient is alert and oriented x3, needs constant verbal stimulation to maintain alertness   Psychiatric:         Mood and Affect: Mood normal.        EMERGENCY DEPARTMENT COURSE and DIFFERENTIAL DIAGNOSIS/MDM   CC/HPI Summary, DDx, ED Course, and Reassessment:     MDM  Number of Diagnoses or Management Options  Opioid overdose, accidental or unintentional, initial encounter [facility])  Diagnosis management comments: Patient is a 59-year-old male presenting with concern of opioid OD, does endorse snorting heroin today, patient present stable, no apparent distress, denies any complaints pain, alert and oriented although requiring significant stimulation to maintain his respiratory status, when left alone patient has oxygen saturation of 90% and respiratory rate of 7, due to this we will provide 0.4 mg IV Narcan, will obtain x-ray to evaluate for pulmonary edema, do not feel that further lab work is indicated today as patient has known cause of respiratory status, will continue to observe after Narcan and reevaluate for final disposition. ED Course as of 03/13/23 2027   Mon Mar 13, 2023   1900 Patient continues to have fatigue, respirations of 10, end-tidal 45, will continue to monitor [RN]      ED Course User Index  [RN] Génesis Esteban MD       Vitals:    03/13/23 1616 03/13/23 1657 03/13/23 1717 03/13/23 1734   BP: (!) 146/86 132/81 125/87 127/84   Pulse: (!) 102 97 97 86   Resp: 16 (!) 7 16 12   Temp:    98.6 °F (37 °C)   SpO2: 95% 95% 96% 97%   Weight: 68 kg (150 lb)      Height: 5' 5\" (1.651 m)           Patient was given the following medications:  Medications   naloxone (NARCAN) injection 0.4 mg (0.4 mg IntraVENous Given 3/13/23 1658)       CONSULTS:  None    Social Determinants affecting Dx or Tx: None    Records Reviewed (source and summary of external notes): Prior medical records  DIAGNOSTIC RESULTS   LABS:     No results found for this or any previous visit (from the past 12 hour(s)). EKG interpreted by me: Regular rate and rhythm, 94, no ST changes, normal QTc     RADIOLOGY:  Non-plain film images such as CT, Ultrasound and MRI are read by the radiologist.   Plain radiographic images are often visualized and preliminarily interpreted by the ED, if an interpretation was completed the findings will be listed below:        Interpretation per the Radiologist below, if available at the time of this note:     XR CHEST PORT    Result Date: 3/13/2023  INDICATION:  OD EXAM: Chest single view. COMPARISON: None. Erskin Cra FINDINGS: A single frontal view of the chest at 1818 hours shows clear lungs. The heart, mediastinum and pulmonary vasculature are normal .  The bony thorax is unremarkable for age. Erskin [facility]      No acute cardiopulmonary disease radiographically. .  . PROCEDURES   Unless otherwise noted below, none  Procedures     CRITICAL CARE TIME       FINAL IMPRESSION     1. Opioid overdose, accidental or unintentional, initial encounter [facility])          DISPOSITION/PLAN   Discharged    Discharge Note: The patient is stable for discharge home. The signs, symptoms, diagnosis, and discharge instructions have been discussed, understanding conveyed, and agreed upon. The patient is to follow up as recommended or return to ER should their symptoms worsen. PATIENT REFERRED TO:  Follow-up Information       Follow up With Specialties Details Why Contact Info    [facility] EMERGENCY DEPT Emergency Medicine   [address] [place] [address] Drive  [address] [facility]  [phone]    [facility]    Address: [address] [place] [address] [address], [address]  Hours:   Closed · Opens 8? AM Tue  Phone: [phone]              DISCHARGE MEDICATIONS:  Current Discharge Medication List        CONTINUE these medications which have CHANGED    Details   naloxone (Narcan) 4 mg/actuation nasal spray Use 1 spray intranasally, then discard. Repeat with new spray every 2 min as needed for opioid overdose symptoms, alternating nostrils. Qty: 2 Each, Refills: 0  Start date: 3/13/2023               DISCONTINUED MEDICATIONS:  Current Discharge Medication List          I am the Primary Clinician of Record. Signed By: Vic Kim MD     March 13, 2023      (Please note that parts of this dictation were completed with voice recognition software. Quite often unanticipated grammatical, syntax, homophones, and other interpretive errors are inadvertently transcribed by the computer software. Please disregards these errors.  Please excuse any errors that have escaped final proofreading.)

## 2023-03-13 NOTE — Clinical Note
[address]  [facility] EMERGENCY DEPT  [address] [place] [id number] [phone]    Work/School Note    Date: 3/13/2023    To Whom It May concern:    Manjit Capps was seen and treated today in the emergency room by the following provider(s):  Attending Provider: Ryan Robb MD.      Mnajit Capps is excused from work/school on 03/13/23 and 03/14/23. He is medically clear to return to work/school on 3/15/2023.        Sincerely,          Samanta Balbuena MD

## 2023-03-15 LAB
ATRIAL RATE: 94 BPM
CALCULATED P AXIS, ECG09: 73 DEGREES
CALCULATED R AXIS, ECG10: 37 DEGREES
CALCULATED T AXIS, ECG11: 34 DEGREES
DIAGNOSIS, 93000: NORMAL
P-R INTERVAL, ECG05: 144 MS
Q-T INTERVAL, ECG07: 364 MS
QRS DURATION, ECG06: 84 MS
QTC CALCULATION (BEZET), ECG08: 455 MS
VENTRICULAR RATE, ECG03: 94 BPM

## 2023-03-18 ENCOUNTER — HOSPITAL ENCOUNTER (EMERGENCY)
Age: 58
Discharge: HOME | End: 2023-03-18
Attending: EMERGENCY MEDICINE
Payer: COMMERCIAL

## 2023-03-18 VITALS
HEART RATE: 92 BPM | DIASTOLIC BLOOD PRESSURE: 67 MMHG | OXYGEN SATURATION: 99 % | TEMPERATURE: 98.9 F | SYSTOLIC BLOOD PRESSURE: 113 MMHG | RESPIRATION RATE: 11 BRPM

## 2023-03-18 DIAGNOSIS — T40.1X1A ACCIDENTAL OVERDOSE OF HEROIN, INITIAL ENCOUNTER (HCC): Primary | ICD-10-CM

## 2023-03-18 LAB
ALBUMIN SERPL-MCNC: 3.3 G/DL (ref 3.5–5)
ALBUMIN/GLOB SERPL: 0.8 (ref 1.1–2.2)
ALP SERPL-CCNC: 137 U/L (ref 45–117)
ALT SERPL-CCNC: 18 U/L (ref 12–78)
ANION GAP SERPL CALC-SCNC: 3 MMOL/L (ref 5–15)
APAP SERPL-MCNC: <2 UG/ML (ref 10–30)
AST SERPL-CCNC: 12 U/L (ref 15–37)
BASOPHILS # BLD: 0 K/UL (ref 0–0.1)
BASOPHILS NFR BLD: 1 % (ref 0–1)
BILIRUB SERPL-MCNC: 0.3 MG/DL (ref 0.2–1)
BNP SERPL-MCNC: 49 PG/ML
BUN SERPL-MCNC: 13 MG/DL (ref 6–20)
BUN/CREAT SERPL: 12 (ref 12–20)
CALCIUM SERPL-MCNC: 8 MG/DL (ref 8.5–10.1)
CHLORIDE SERPL-SCNC: 105 MMOL/L (ref 97–108)
CO2 SERPL-SCNC: 28 MMOL/L (ref 21–32)
CREAT SERPL-MCNC: 1.05 MG/DL (ref 0.7–1.3)
DIFFERENTIAL METHOD BLD: NORMAL
EOSINOPHIL # BLD: 0.2 K/UL (ref 0–0.4)
EOSINOPHIL NFR BLD: 3 % (ref 0–7)
ERYTHROCYTE [DISTWIDTH] IN BLOOD BY AUTOMATED COUNT: 14 % (ref 11.5–14.5)
ETHANOL SERPL-MCNC: <10 MG/DL
GLOBULIN SER CALC-MCNC: 4.1 G/DL (ref 2–4)
GLUCOSE SERPL-MCNC: 179 MG/DL (ref 65–100)
HCT VFR BLD AUTO: 38.6 % (ref 36.6–50.3)
HGB BLD-MCNC: 12.2 G/DL (ref 12.1–17)
IMM GRANULOCYTES # BLD AUTO: 0 K/UL (ref 0–0.04)
IMM GRANULOCYTES NFR BLD AUTO: 0 % (ref 0–0.5)
LYMPHOCYTES # BLD: 2 K/UL (ref 0.8–3.5)
LYMPHOCYTES NFR BLD: 27 % (ref 12–49)
MCH RBC QN AUTO: 28.8 PG (ref 26–34)
MCHC RBC AUTO-ENTMCNC: 31.6 G/DL (ref 30–36.5)
MCV RBC AUTO: 91 FL (ref 80–99)
MONOCYTES # BLD: 0.6 K/UL (ref 0–1)
MONOCYTES NFR BLD: 8 % (ref 5–13)
NEUTS SEG # BLD: 4.8 K/UL (ref 1.8–8)
NEUTS SEG NFR BLD: 61 % (ref 32–75)
NRBC # BLD: 0 K/UL (ref 0–0.01)
NRBC BLD-RTO: 0 PER 100 WBC
PLATELET # BLD AUTO: 308 K/UL (ref 150–400)
PMV BLD AUTO: 10 FL (ref 8.9–12.9)
POTASSIUM SERPL-SCNC: 3.8 MMOL/L (ref 3.5–5.1)
PROT SERPL-MCNC: 7.4 G/DL (ref 6.4–8.2)
RBC # BLD AUTO: 4.24 M/UL (ref 4.1–5.7)
SODIUM SERPL-SCNC: 136 MMOL/L (ref 136–145)
TROPONIN I SERPL HS-MCNC: 8 NG/L (ref 0–76)
WBC # BLD AUTO: 7.7 K/UL (ref 4.1–11.1)

## 2023-03-18 PROCEDURE — 93005 ELECTROCARDIOGRAM TRACING: CPT

## 2023-03-18 PROCEDURE — 82077 ASSAY SPEC XCP UR&BREATH IA: CPT

## 2023-03-18 PROCEDURE — 84484 ASSAY OF TROPONIN QUANT: CPT

## 2023-03-18 PROCEDURE — 80053 COMPREHEN METABOLIC PANEL: CPT

## 2023-03-18 PROCEDURE — 99284 EMERGENCY DEPT VISIT MOD MDM: CPT

## 2023-03-18 PROCEDURE — 36415 COLL VENOUS BLD VENIPUNCTURE: CPT

## 2023-03-18 PROCEDURE — 80143 DRUG ASSAY ACETAMINOPHEN: CPT

## 2023-03-18 PROCEDURE — 83880 ASSAY OF NATRIURETIC PEPTIDE: CPT

## 2023-03-18 PROCEDURE — 85025 COMPLETE CBC W/AUTO DIFF WBC: CPT

## 2023-03-18 PROCEDURE — 96374 THER/PROPH/DIAG INJ IV PUSH: CPT

## 2023-03-18 PROCEDURE — 74011250636 HC RX REV CODE- 250/636: Performed by: EMERGENCY MEDICINE

## 2023-03-18 RX ORDER — NALOXONE HYDROCHLORIDE 0.4 MG/ML
INJECTION, SOLUTION INTRAMUSCULAR; INTRAVENOUS; SUBCUTANEOUS
Status: DISCONTINUED
Start: 2023-03-18 | End: 2023-03-18 | Stop reason: HOSPADM

## 2023-03-18 RX ORDER — NALOXONE HYDROCHLORIDE 0.4 MG/ML
0.4 INJECTION, SOLUTION INTRAMUSCULAR; INTRAVENOUS; SUBCUTANEOUS
Status: COMPLETED | OUTPATIENT
Start: 2023-03-18 | End: 2023-03-18

## 2023-03-18 RX ADMIN — NALOXONE HYDROCHLORIDE 0.4 MG: 0.4 INJECTION, SOLUTION INTRAMUSCULAR; INTRAVENOUS; SUBCUTANEOUS at 13:44

## 2023-03-18 NOTE — ED PROVIDER NOTES
[facility] EMERGENCY DEPT  EMERGENCY DEPARTMENT ENCOUNTER       Pt Name: Rich Wren  MRN: [id number]  [place] 1965  Date of evaluation: 3/18/2023  Provider: Reji Francois DO   PCP: None  Note Started: 4:47 PM 3/18/23     CHIEF COMPLAINT       Chief Complaint   Patient presents with    Drug Overdose     Pt overdosed on heroin, per patient. Pt lethargic and drowsy at triage but alert and oriented x 4        HISTORY OF PRESENT ILLNESS: 1 or more elements      History From: patient/EMS, History limited by: none     Rich Wren is a 62 y.o. male arrives by EMS secondary to being drowsy and in and out of consciousness. EMS had not administered any Narcan. Patient does report use of heroin today. Patient arrives with no complaints he is slightly hypoxic and was placed on oxygen. He denies any fever chills. He denies any chest pain or shortness of breath. Denies any vomiting or diarrhea. He denies any abdominal pain, body aches or chills. Patient states intermittent use of heroin last use was approximately week ago. Please See [facility] for Additional Details of the HPI/PMH  Nursing Notes were all reviewed and agreed with or any disagreements were addressed in the HPI. REVIEW OF SYSTEMS        Positives and Pertinent negatives as per HPI. PAST HISTORY     Past Medical History:  OPIATE USE    Past Surgical History:  nONE    Family History:  No family history on file. Social History:  Social History     Tobacco Use    Smoking status: Every Day     Packs/day: 0.50     Types: Cigarettes    Smokeless tobacco: Never   Substance Use Topics    Alcohol use: Not Currently    Drug use: Yes     Types: Heroin     Comment: hx OD       Allergies:  No Known Allergies    CURRENT MEDICATIONS      Discharge Medication List as of 3/18/2023  2:53 PM        CONTINUE these medications which have NOT CHANGED    Details   naloxone (Narcan) 4 mg/actuation nasal spray Use 1 spray intranasally, then discard.  Repeat with new spray every 2 min as needed for opioid overdose symptoms, alternating nostrils. , Print, Disp-2 Each, R-0             SCREENINGS               No data recorded         PHYSICAL EXAM      ED Triage Vitals   ED Encounter Vitals Group      BP 03/18/23 1345 (!) 163/82      Pulse (Heart Rate) 03/18/23 1345 98      Resp Rate 03/18/23 1400 11      Temp 03/18/23 1345 98.9 °F (37.2 °C)      Temp src --       O2 Sat (%) 03/18/23 1345 96 %      Weight --       Height --         Physical Exam  Vitals and nursing note reviewed. Constitutional:       General: He is not in acute distress. Appearance: He is well-developed. He is not diaphoretic. Comments: Drowsy, falls asleep often. HENT:      Head: Normocephalic and atraumatic. Mouth/Throat:      Mouth: Mucous membranes are moist.      Pharynx: No oropharyngeal exudate. Eyes:      General: No scleral icterus. Extraocular Movements: Extraocular movements intact. Conjunctiva/sclera: Conjunctivae normal.      Pupils: Pupils are equal, round, and reactive to light. Neck:      Vascular: No JVD. Trachea: No tracheal deviation. Cardiovascular:      Rate and Rhythm: Normal rate and regular rhythm. Heart sounds: Normal heart sounds. No murmur heard. Pulmonary:      Effort: Pulmonary effort is normal. No respiratory distress. Breath sounds: Normal breath sounds. No stridor. No wheezing or rales. Chest:      Chest wall: No tenderness. Abdominal:      General: There is no distension. Palpations: Abdomen is soft. Tenderness: There is no abdominal tenderness. There is no guarding or rebound. Musculoskeletal:         General: No deformity. Normal range of motion. Cervical back: Normal range of motion and neck supple. Right lower leg: No edema. Left lower leg: No edema. Skin:     General: Skin is warm and dry. Capillary Refill: Capillary refill takes less than 2 seconds.    Neurological:      Mental Status: He is alert and oriented to person, place, and time. Cranial Nerves: No cranial nerve deficit. Comments: No gross motor or sensory deficits    Psychiatric:         Mood and Affect: Mood normal.         Behavior: Behavior normal.        DIAGNOSTIC RESULTS   LABS:     Recent Results (from the past 12 hour(s))   TROPONIN-HIGH SENSITIVITY    Collection Time: 03/18/23  1:38 PM   Result Value Ref Range    Troponin-High Sensitivity 8 0 - 76 ng/L   CBC WITH AUTOMATED DIFF    Collection Time: 03/18/23  1:45 PM   Result Value Ref Range    WBC 7.7 4.1 - 11.1 K/uL    RBC 4.24 4.10 - 5.70 M/uL    HGB 12.2 12.1 - 17.0 g/dL    HCT 38.6 36.6 - 50.3 %    MCV 91.0 80.0 - 99.0 FL    MCH 28.8 26.0 - 34.0 PG    MCHC 31.6 30.0 - 36.5 g/dL    RDW 14.0 11.5 - 14.5 %    PLATELET 404 915 - 064 K/uL    MPV 10.0 8.9 - 12.9 FL    NRBC 0.0 0  WBC    ABSOLUTE NRBC 0.00 0.00 - 0.01 K/uL    NEUTROPHILS 61 32 - 75 %    LYMPHOCYTES 27 12 - 49 %    MONOCYTES 8 5 - 13 %    EOSINOPHILS 3 0 - 7 %    BASOPHILS 1 0 - 1 %    IMMATURE GRANULOCYTES 0 0.0 - 0.5 %    ABS. NEUTROPHILS 4.8 1.8 - 8.0 K/UL    ABS. LYMPHOCYTES 2.0 0.8 - 3.5 K/UL    ABS. MONOCYTES 0.6 0.0 - 1.0 K/UL    ABS. EOSINOPHILS 0.2 0.0 - 0.4 K/UL    ABS. BASOPHILS 0.0 0.0 - 0.1 K/UL    ABS. IMM.  GRANS. 0.0 0.00 - 0.04 K/UL    DF AUTOMATED     NT-PRO BNP    Collection Time: 03/18/23  1:45 PM   Result Value Ref Range    NT pro-BNP 49 <434 PG/ML   METABOLIC PANEL, COMPREHENSIVE    Collection Time: 03/18/23  1:45 PM   Result Value Ref Range    Sodium 136 136 - 145 mmol/L    Potassium 3.8 3.5 - 5.1 mmol/L    Chloride 105 97 - 108 mmol/L    CO2 28 21 - 32 mmol/L    Anion gap 3 (L) 5 - 15 mmol/L    Glucose 179 (H) 65 - 100 mg/dL    BUN 13 6 - 20 MG/DL    Creatinine 1.05 0.70 - 1.30 MG/DL    BUN/Creatinine ratio 12 12 - 20      eGFR >60 >60 ml/min/1.73m2    Calcium 8.0 (L) 8.5 - 10.1 MG/DL    Bilirubin, total 0.3 0.2 - 1.0 MG/DL    ALT (SGPT) 18 12 - 78 U/L    AST (SGOT) 12 (L) 15 - 37 U/L Alk. phosphatase 137 (H) 45 - 117 U/L    Protein, total 7.4 6.4 - 8.2 g/dL    Albumin 3.3 (L) 3.5 - 5.0 g/dL    Globulin 4.1 (H) 2.0 - 4.0 g/dL    A-G Ratio 0.8 (L) 1.1 - 2.2     ACETAMINOPHEN    Collection Time: 03/18/23  1:45 PM   Result Value Ref Range    Acetaminophen level <2 (L) 10 - 30 ug/mL   ETHYL ALCOHOL    Collection Time: 03/18/23  1:45 PM   Result Value Ref Range    ALCOHOL(ETHYL),SERUM <10 <10 MG/DL        EKG: If performed, independent interpretation documented below in the MDM section     RADIOLOGY:  None     PROCEDURES   Unless otherwise noted below, none  Procedures     CRITICAL CARE TIME   none    EMERGENCY DEPARTMENT COURSE and DIFFERENTIAL DIAGNOSIS/MDM   Vitals:    Vitals:    03/18/23 1345 03/18/23 1400   BP: (!) 163/82 113/67   Pulse: 98 92   Resp:  11   Temp: 98.9 °F (37.2 °C)    SpO2: 96% 99%        Patient was given the following medications:  Medications   naloxone (NARCAN) injection 0.4 mg (0.4 mg IntraVENous Given 3/18/23 1344)       Medical Decision Making  Amount and/or Complexity of Data Reviewed  Labs: ordered. ECG/medicine tests: ordered. Risk  Prescription drug management. Patient has had several ED visits over the past 2 years related to accidental overdose of heroin. Patient was placed on oxygen for hypoxia however he has not had any apneic spells. Narcan order placed in case patient develops any apneic spells. Patient not in active withdrawal however discussed with him follow-up with jose eduardo Gill as a possibility if he would like to entertain being in a recovery program.  Patient observed for few hours without any need for Narcan. Patient discharged home. FINAL IMPRESSION     1. Accidental overdose of heroin, initial encounter [facility])          DISPOSITION/PLAN   Heather Dixon  results have been reviewed with him. He has been counseled regarding his diagnosis, treatment, and plan.   He verbally conveys understanding and agreement of the signs, symptoms, diagnosis, treatment and prognosis and additionally agrees to follow up as discussed. He also agrees with the care-plan and conveys that all of his questions have been answered. I have also provided discharge instructions for him that include: educational information regarding their diagnosis and treatment, and list of reasons why they would want to return to the ED prior to their follow-up appointment, should his condition change. CLINICAL IMPRESSION    Discharge Note: The patient is stable for discharge home. The signs, symptoms, diagnosis, and discharge instructions have been discussed, understanding conveyed, and agreed upon. The patient is to follow up as recommended or return to ER should their symptoms worsen. PATIENT REFERRED TO:  Follow-up Information       Follow up With Specialties Details Why Contact Info    Slick Monte MD Addiction Medicine [place] [address] [address]  [address]                 DISCHARGE MEDICATIONS:  Discharge Medication List as of 3/18/2023  2:53 PM            DISCONTINUED MEDICATIONS:  Discharge Medication List as of 3/18/2023  2:53 PM          I am the Primary Clinician of Record. Marcos Rao,  (electronically signed)    (Please note that parts of this dictation were completed with voice recognition software. Quite often unanticipated grammatical, syntax, homophones, and other interpretive errors are inadvertently transcribed by the computer software. Please disregards these errors.  Please excuse any errors that have escaped final proofreading.)

## 2023-03-19 LAB
ATRIAL RATE: 111 BPM
CALCULATED P AXIS, ECG09: 78 DEGREES
CALCULATED R AXIS, ECG10: 31 DEGREES
CALCULATED T AXIS, ECG11: 50 DEGREES
DIAGNOSIS, 93000: NORMAL
P-R INTERVAL, ECG05: 124 MS
Q-T INTERVAL, ECG07: 340 MS
QRS DURATION, ECG06: 76 MS
QTC CALCULATION (BEZET), ECG08: 462 MS
VENTRICULAR RATE, ECG03: 111 BPM